# Patient Record
Sex: MALE | Race: WHITE | Employment: STUDENT | URBAN - METROPOLITAN AREA
[De-identification: names, ages, dates, MRNs, and addresses within clinical notes are randomized per-mention and may not be internally consistent; named-entity substitution may affect disease eponyms.]

---
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
## 2025-02-21 ENCOUNTER — OFFICE VISIT (OUTPATIENT)
Dept: PRIMARY CARE CLINIC | Age: 20
End: 2025-02-21

## 2025-02-21 VITALS
SYSTOLIC BLOOD PRESSURE: 124 MMHG | OXYGEN SATURATION: 100 % | HEART RATE: 71 BPM | HEIGHT: 70 IN | BODY MASS INDEX: 18.61 KG/M2 | RESPIRATION RATE: 16 BRPM | DIASTOLIC BLOOD PRESSURE: 70 MMHG | WEIGHT: 130 LBS | TEMPERATURE: 97.4 F

## 2025-02-21 DIAGNOSIS — J06.9 URI WITH COUGH AND CONGESTION: Primary | ICD-10-CM

## 2025-02-21 DIAGNOSIS — J02.9 SORE THROAT: ICD-10-CM

## 2025-02-21 LAB
INFLUENZA A ANTIBODY: NORMAL
INFLUENZA B ANTIBODY: NORMAL
S PYO AG THROAT QL: NORMAL

## 2025-02-21 RX ORDER — METHYLPREDNISOLONE 4 MG/1
TABLET ORAL
Qty: 1 KIT | Refills: 0 | Status: SHIPPED | OUTPATIENT
Start: 2025-02-21

## 2025-02-21 NOTE — PROGRESS NOTES
2025     Mauricio Topete 19 y.o. male    : 2005   Chief Complaint:   Sore Throat (Day 2 took allergy meds didn't help)    History of Present Illness   Source of history provided by:  patient.    Mauricio Topete is a 19 y.o. old male who presents to walk-in for evaluation of sore throat x 2 days. Associated symptoms include mild congestion.  Since onset symptoms have been consistent.  Patient has had no known Covid 19 exposure.  Patient has not been diagnosed with COVID-19 in the last 90 days.  Has taken Allergy medication at home with no symptomatic relief. Denies any fever, chills, CP, dyspnea, LE edema, abdominal pain, nausea, vomiting, rash, dizziness, or lethargy. Denies any history of  pneumonia, recurrent bronchitis or COPD.  Patient does have a history of asthma.  They have no history of tobacco abuse.        ROS   Past Medical History:   Past Medical History:   Diagnosis Date    Anxiety     Asthma      Past Surgical History:  has no past surgical history on file.  Social History:  reports that he has never smoked. He has never been exposed to tobacco smoke. He has never used smokeless tobacco. He reports that he does not currently use alcohol. He reports current drug use. Drug: Marijuana (Weed).  Family History: family history is not on file.   Allergies: Amoxicillin and Fluticasone propionate    Unless otherwise stated in this report the patient's positive and negative responses for review of systems for constitutional, eyes, ENT, cardiovascular, respiratory, gastrointestinal, neurological, , musculoskeletal, and integument systems and related systems to the presenting problem are either stated in the history of present illness or were not pertinent or were negative for the symptoms and/or complaints related to the presenting medical problem.  Positives and pertinent negatives as per HPI.  All others reviewed and are negative.    Physical Exam   VS:    Vitals:    25 1530   BP: